# Patient Record
Sex: FEMALE | Race: OTHER | Employment: UNEMPLOYED | URBAN - METROPOLITAN AREA
[De-identification: names, ages, dates, MRNs, and addresses within clinical notes are randomized per-mention and may not be internally consistent; named-entity substitution may affect disease eponyms.]

---

## 2021-05-12 ENCOUNTER — OFFICE VISIT (OUTPATIENT)
Dept: FAMILY MEDICINE CLINIC | Age: 21
End: 2021-05-12
Payer: COMMERCIAL

## 2021-05-12 VITALS
HEIGHT: 66 IN | WEIGHT: 167 LBS | BODY MASS INDEX: 26.84 KG/M2 | HEART RATE: 72 BPM | SYSTOLIC BLOOD PRESSURE: 101 MMHG | DIASTOLIC BLOOD PRESSURE: 61 MMHG | TEMPERATURE: 98.6 F

## 2021-05-12 DIAGNOSIS — Z13.220 SCREENING, LIPID: ICD-10-CM

## 2021-05-12 DIAGNOSIS — Z00.00 WELL ADULT EXAM: Primary | ICD-10-CM

## 2021-05-12 PROCEDURE — 99203 OFFICE O/P NEW LOW 30 MIN: CPT | Performed by: FAMILY MEDICINE

## 2021-05-12 SDOH — ECONOMIC STABILITY: TRANSPORTATION INSECURITY
IN THE PAST 12 MONTHS, HAS LACK OF TRANSPORTATION KEPT YOU FROM MEETINGS, WORK, OR FROM GETTING THINGS NEEDED FOR DAILY LIVING?: NO

## 2021-05-12 SDOH — ECONOMIC STABILITY: FOOD INSECURITY: WITHIN THE PAST 12 MONTHS, YOU WORRIED THAT YOUR FOOD WOULD RUN OUT BEFORE YOU GOT MONEY TO BUY MORE.: NEVER TRUE

## 2021-05-12 SDOH — ECONOMIC STABILITY: TRANSPORTATION INSECURITY
IN THE PAST 12 MONTHS, HAS THE LACK OF TRANSPORTATION KEPT YOU FROM MEDICAL APPOINTMENTS OR FROM GETTING MEDICATIONS?: NO

## 2021-05-12 SDOH — ECONOMIC STABILITY: INCOME INSECURITY: HOW HARD IS IT FOR YOU TO PAY FOR THE VERY BASICS LIKE FOOD, HOUSING, MEDICAL CARE, AND HEATING?: NOT HARD AT ALL

## 2021-05-12 SDOH — ECONOMIC STABILITY: FOOD INSECURITY: WITHIN THE PAST 12 MONTHS, THE FOOD YOU BOUGHT JUST DIDN'T LAST AND YOU DIDN'T HAVE MONEY TO GET MORE.: NEVER TRUE

## 2021-05-12 ASSESSMENT — PATIENT HEALTH QUESTIONNAIRE - PHQ9
SUM OF ALL RESPONSES TO PHQ QUESTIONS 1-9: 0
SUM OF ALL RESPONSES TO PHQ9 QUESTIONS 1 & 2: 0

## 2021-05-12 ASSESSMENT — ENCOUNTER SYMPTOMS
DIARRHEA: 0
SHORTNESS OF BREATH: 0
COUGH: 0
SINUS PAIN: 0
BACK PAIN: 0
EYE REDNESS: 0
PHOTOPHOBIA: 0
RHINORRHEA: 0
CONSTIPATION: 0
EYE DISCHARGE: 0

## 2021-05-12 NOTE — PROGRESS NOTES
2021    Radha Carr    Chief Complaint   Patient presents with   [facility] Establish Care       HPI  History was obtained from patient. El Mast is a 21 y.o. female who presents today with the followin. Well adult exam    2. Screening, lipid       Is here to establish primary care. Patient has no problems at all today. She takes no medication on a regular basis. She was seen in the last month by gynecologist for routine checkup. Patient has a 3year-old daughter. She just moved to the area from [place] about 6 weeks ago. REVIEW OF SYMPTOMS    Review of Systems   Constitutional: Negative for chills, fatigue and fever. HENT: Negative for congestion, mouth sores, postnasal drip, rhinorrhea and sinus pain. Eyes: Negative for photophobia, discharge and redness. Respiratory: Negative for cough and shortness of breath. Cardiovascular: Negative for chest pain. Gastrointestinal: Negative for constipation and diarrhea. Genitourinary: Negative for difficulty urinating, dysuria, hematuria, urgency, vaginal bleeding and vaginal discharge. Musculoskeletal: Negative for back pain. Neurological: Negative for headaches. Psychiatric/Behavioral: Negative for sleep disturbance. PAST MEDICAL HISTORY  No past medical history on file. FAMILY HISTORY  No family history on file.     SOCIAL HISTORY  Social History     Socioeconomic History    Marital status: None     Spouse name: None    Number of children: None    Years of education: None    Highest education level: None   Occupational History    None   Social Needs    Financial resource strain: Not hard at all   Boomer-Niesha insecurity     Worry: Never true     Inability: Never true    Transportation needs     Medical: No     Non-medical: No   Tobacco Use    Smoking status: Never Smoker    Smokeless tobacco: Never Used   Substance and Sexual Activity    Alcohol use: None    Drug use: None    Sexual activity: None   Lifestyle    Physical activity Days per week: None     Minutes per session: None    Stress: None   Relationships    Social connections     Talks on phone: None     Gets together: None     Attends Congregational service: None     Active member of club or organization: None     Attends meetings of clubs or organizations: None     Relationship status: None    Intimate partner violence     Fear of current or ex partner: None     Emotionally abused: None     Physically abused: None     Forced sexual activity: None   Other Topics Concern    None   Social History Narrative    None        SURGICAL HISTORY  No past surgical history on file. CURRENT MEDICATIONS  No current outpatient medications on file. No current facility-administered medications for this visit. ALLERGIES  No Known Allergies    PHYSICAL EXAM    /61   Pulse 72   Temp 98.6 °F (37 °C) (Temporal)   Ht 5' 6\" (1.676 m)   Wt 167 lb (75.8 kg)   LMP 04/12/2021   HC 20 cm (7.87\")   BMI 26.95 kg/m²     Physical Exam  Vitals signs and nursing note reviewed. Constitutional:       Appearance: Normal appearance. HENT:      Head: Normocephalic and atraumatic. Right Ear: Tympanic membrane normal.      Nose: Nose normal.      Mouth/Throat:      Mouth: Mucous membranes are moist.      Pharynx: Oropharynx is clear. Eyes:      General: No scleral icterus. Extraocular Movements: Extraocular movements intact. Conjunctiva/sclera: Conjunctivae normal.   Neck:      Musculoskeletal: Neck supple. Cardiovascular:      Rate and Rhythm: Normal rate and regular rhythm. Pulses: Normal pulses. Heart sounds: Normal heart sounds. Pulmonary:      Effort: Pulmonary effort is normal.      Breath sounds: Normal breath sounds. Abdominal:      General: Abdomen is flat. Bowel sounds are normal.      Palpations: Abdomen is soft. There is no mass. Tenderness: There is no abdominal tenderness. There is no right CVA tenderness or left CVA tenderness.

## 2021-05-20 ENCOUNTER — HOSPITAL ENCOUNTER (OUTPATIENT)
Age: 21
Discharge: HOME | End: 2021-05-20
Payer: COMMERCIAL

## 2021-05-20 DIAGNOSIS — Z13.220 SCREENING, LIPID: ICD-10-CM

## 2021-05-20 LAB
CHOLESTEROL, FASTING: 139 MG/DL (ref 0–199)
HDLC SERPL-MCNC: 34 MG/DL
LDL CHOLESTEROL CALCULATED: 64 MG/DL (ref 0–99)
TRIGLYCERIDE, FASTING: 204 MG/DL (ref 0–149)
VLDLC SERPL CALC-MCNC: 41 MG/DL

## 2021-05-20 PROCEDURE — 80061 LIPID PANEL: CPT

## 2021-05-20 PROCEDURE — 36415 COLL VENOUS BLD VENIPUNCTURE: CPT

## 2021-07-09 ENCOUNTER — NURSE TRIAGE (OUTPATIENT)
Dept: OTHER | Facility: CLINIC | Age: 21
End: 2021-07-09

## 2021-07-09 ENCOUNTER — TELEPHONE (OUTPATIENT)
Dept: ADMINISTRATIVE | Age: 21
End: 2021-07-09

## 2021-07-09 NOTE — TELEPHONE ENCOUNTER
Reason for Disposition   Bloody, black, or tarry bowel movements (Exception: chronic-unchanged black-grey bowel movements and is taking iron pills or Pepto-bismol)    Answer Assessment - Initial Assessment Questions  1. DIARRHEA SEVERITY: \"How bad is the diarrhea? \" \"How many extra stools have you had in the past 24 hours than normal?\"     - NO DIARRHEA (SCALE 0)    - MILD (SCALE 1-3): Few loose or mushy BMs; increase of 1-3 stools over normal daily number of stools; mild increase in ostomy output. -  MODERATE (SCALE 4-7): Increase of 4-6 stools daily over normal; moderate increase in ostomy output. * SEVERE (SCALE 8-10; OR 'WORST POSSIBLE'): Increase of 7 or more stools daily over normal; moderate increase in ostomy output; incontinence.      5    2. ONSET: \"When did the diarrhea begin? \"       8 days ago    3. BM CONSISTENCY: \"How loose or watery is the diarrhea? \"       Water    4. VOMITING: \"Are you also vomiting? \" If so, ask: \"How many times in the past 24 hours? \"       None    5. ABDOMINAL PAIN: Eliverto Simmers you having any abdominal pain? \" If yes: \"What does it feel like? \" (e.g., crampy, dull, intermittent, constant)       Cramping like a period    6. ABDOMINAL PAIN SEVERITY: If present, ask: \"How bad is the pain? \"  (e.g., Scale 1-10; mild, moderate, or severe)    - MILD (1-3): doesn't interfere with normal activities, abdomen soft and not tender to touch     - MODERATE (4-7): interferes with normal activities or awakens from sleep, tender to touch     - SEVERE (8-10): excruciating pain, doubled over, unable to do any normal activities        8/10    7. ORAL INTAKE: If vomiting, \"Have you been able to drink liquids? \" \"How much fluids have you had in the past 24 hours? \"      Yes, eating less, but more fluids    8. HYDRATION: \"Any signs of dehydration? \" (e.g., dry mouth [not just dry lips], too weak to stand, dizziness, new weight loss) \"When did you last urinate? \"      Normal    9.  EXPOSURE: \"Have you traveled to a foreign country recently? \" \"Have you been exposed to anyone with diarrhea? \" \"Could you have eaten any food that was spoiled? \"      Not that she is aware of    10. ANTIBIOTIC USE: \"Are you taking antibiotics now or have you taken antibiotics in the past 2 months? \"        None     11. OTHER SYMPTOMS: \"Do you have any other symptoms? \" (e.g., fever, blood in stool)        Tooth pain and earache- diarrhea looks black yesterday    12. PREGNANCY: \"Is there any chance you are pregnant? \" \"When was your last menstrual period? \"        None on Birth control    Protocols used: DIARRHEA-ADULT-[place]    Received call from Antonella at [facility] with [employer]. Brief description of triage: Diarrhea for 8 days. Caller states it is watery and black in appearance. Caller states she has severe abdominal pain, toothache and ear pain. Caller denies fever. Triage indicates for patient to Go to ED Now. Care advice provided, patient verbalizes understanding; denies any other questions or concerns; instructed to call back for any new or worsening symptoms. Writer did not transfer patient to ECC. Attention Provider: Thank you for allowing me to participate in the care of your patient. The patient was connected to triage in response to information provided to the [facility]. Please do not respond through this encounter as the response is not directed to a shared pool.

## 2021-07-13 ENCOUNTER — HOSPITAL ENCOUNTER (EMERGENCY)
Age: 21
Discharge: HOME | End: 2021-07-13
Attending: EMERGENCY MEDICINE
Payer: COMMERCIAL

## 2021-07-13 VITALS
BODY MASS INDEX: 23.3 KG/M2 | HEART RATE: 68 BPM | RESPIRATION RATE: 16 BRPM | OXYGEN SATURATION: 99 % | TEMPERATURE: 97.1 F | SYSTOLIC BLOOD PRESSURE: 122 MMHG | WEIGHT: 145 LBS | HEIGHT: 66 IN | DIASTOLIC BLOOD PRESSURE: 72 MMHG

## 2021-07-13 DIAGNOSIS — J02.9 VIRAL PHARYNGITIS: Primary | ICD-10-CM

## 2021-07-13 DIAGNOSIS — R19.7 DIARRHEA, UNSPECIFIED TYPE: ICD-10-CM

## 2021-07-13 DIAGNOSIS — J02.9 SORE THROAT: ICD-10-CM

## 2021-07-13 LAB
ALBUMIN SERPL-MCNC: 4.4 G/DL (ref 3.5–5.2)
ALP BLD-CCNC: 47 U/L (ref 35–104)
ALT SERPL-CCNC: 5 U/L (ref 0–32)
ANION GAP SERPL CALCULATED.3IONS-SCNC: 11 MMOL/L (ref 7–16)
AST SERPL-CCNC: 16 U/L (ref 0–31)
BASOPHILS ABSOLUTE: 0.04 E9/L (ref 0–0.2)
BASOPHILS RELATIVE PERCENT: 0.8 % (ref 0–2)
BILIRUB SERPL-MCNC: 0.5 MG/DL (ref 0–1.2)
BILIRUBIN URINE: NEGATIVE
BLOOD, URINE: NEGATIVE
BUN BLDV-MCNC: 9 MG/DL (ref 6–20)
CALCIUM SERPL-MCNC: 9.3 MG/DL (ref 8.6–10.2)
CHLORIDE BLD-SCNC: 105 MMOL/L (ref 98–107)
CLARITY: CLEAR
CO2: 21 MMOL/L (ref 22–29)
COLOR: YELLOW
CREAT SERPL-MCNC: 0.6 MG/DL (ref 0.5–1)
EOSINOPHILS ABSOLUTE: 0.02 E9/L (ref 0.05–0.5)
EOSINOPHILS RELATIVE PERCENT: 0.4 % (ref 0–6)
GFR AFRICAN AMERICAN: >60
GFR NON-AFRICAN AMERICAN: >60 ML/MIN/1.73
GLUCOSE BLD-MCNC: 83 MG/DL (ref 74–99)
GLUCOSE URINE: NEGATIVE MG/DL
HCG(URINE) PREGNANCY TEST: NEGATIVE
HCT VFR BLD CALC: 45.1 % (ref 34–48)
HEMOGLOBIN: 15.3 G/DL (ref 11.5–15.5)
IMMATURE GRANULOCYTES #: 0.01 E9/L
IMMATURE GRANULOCYTES %: 0.2 % (ref 0–5)
KETONES, URINE: NEGATIVE MG/DL
LEUKOCYTE ESTERASE, URINE: NEGATIVE
LYMPHOCYTES ABSOLUTE: 2.32 E9/L (ref 1.5–4)
LYMPHOCYTES RELATIVE PERCENT: 48.2 % (ref 20–42)
MCH RBC QN AUTO: 30.1 PG (ref 26–35)
MCHC RBC AUTO-ENTMCNC: 33.9 % (ref 32–34.5)
MCV RBC AUTO: 88.8 FL (ref 80–99.9)
MONOCYTES ABSOLUTE: 0.32 E9/L (ref 0.1–0.95)
MONOCYTES RELATIVE PERCENT: 6.7 % (ref 2–12)
NEUTROPHILS ABSOLUTE: 2.1 E9/L (ref 1.8–7.3)
NEUTROPHILS RELATIVE PERCENT: 43.7 % (ref 43–80)
NITRITE, URINE: NEGATIVE
PDW BLD-RTO: 12.5 FL (ref 11.5–15)
PH UA: 6 (ref 5–9)
PLATELET # BLD: 171 E9/L (ref 130–450)
PMV BLD AUTO: 10.4 FL (ref 7–12)
POTASSIUM SERPL-SCNC: 4 MMOL/L (ref 3.5–5)
PROTEIN UA: NEGATIVE MG/DL
RBC # BLD: 5.08 E12/L (ref 3.5–5.5)
SODIUM BLD-SCNC: 137 MMOL/L (ref 132–146)
SPECIFIC GRAVITY UA: 1.02 (ref 1–1.03)
STREP GRP A PCR: NEGATIVE
TOTAL PROTEIN: 7.4 G/DL (ref 6.4–8.3)
UROBILINOGEN, URINE: 0.2 E.U./DL
WBC # BLD: 4.8 E9/L (ref 4.5–11.5)

## 2021-07-13 PROCEDURE — 80053 COMPREHEN METABOLIC PANEL: CPT

## 2021-07-13 PROCEDURE — 81003 URINALYSIS AUTO W/O SCOPE: CPT

## 2021-07-13 PROCEDURE — 36415 COLL VENOUS BLD VENIPUNCTURE: CPT

## 2021-07-13 PROCEDURE — 2580000003 HC RX 258: Performed by: EMERGENCY MEDICINE

## 2021-07-13 PROCEDURE — 81025 URINE PREGNANCY TEST: CPT

## 2021-07-13 PROCEDURE — 87880 STREP A ASSAY W/OPTIC: CPT

## 2021-07-13 PROCEDURE — 85025 COMPLETE CBC W/AUTO DIFF WBC: CPT

## 2021-07-13 PROCEDURE — 99283 EMERGENCY DEPT VISIT LOW MDM: CPT

## 2021-07-13 RX ORDER — 0.9 % SODIUM CHLORIDE 0.9 %
1000 INTRAVENOUS SOLUTION INTRAVENOUS ONCE
Status: COMPLETED | OUTPATIENT
Start: 2021-07-13 | End: 2021-07-13

## 2021-07-13 RX ORDER — DICYCLOMINE HYDROCHLORIDE 10 MG/1
20 CAPSULE ORAL
Qty: 15 CAPSULE | Refills: 0 | Status: SHIPPED | OUTPATIENT
Start: 2021-07-13 | End: 2022-02-02

## 2021-07-13 RX ADMIN — SODIUM CHLORIDE 1000 ML: 9 INJECTION, SOLUTION INTRAVENOUS at 22:26

## 2021-07-14 NOTE — ED PROVIDER NOTES
HPI:  7/13/21, Time: 11:09 PM EDT        January Chand is a 21 y.o. female presenting to the ED for sore throat, ear pain, congestion and diarrhea, beginning 2 weeks ago. The complaint has been persistent, moderate in severity, and worsened by diarrhea. No reported fever/chills, no chest pain heaviness no tightness no abdominal pain. She states that she feels weak. Patient is having taking thing for diarrhea relief to this point. No hematemesis no melena hematochezia reported. No other complaints are reported. No relieving factors. Review of Systems:   A complete review of systems was performed and pertinent positives and negatives are stated within HPI, all other systems reviewed and are negative.      --------------------------------------------- PAST HISTORY ---------------------------------------------  Past Medical History:  has no past medical history on file. Past Surgical History:  has a past surgical history that includes fracture surgery. Social History:  reports that she has never smoked. She has never used smokeless tobacco. She reports previous alcohol use. She reports previous drug use. Family History: family history is not on file. The patients home medications have been reviewed. Allergies: Patient has no known allergies.     -------------------------------------------------- RESULTS -------------------------------------------------  All laboratory and radiology results have been personally reviewed by myself   LABS:  Results for orders placed or performed during the hospital encounter of 07/13/21   Strep Screen Group A Throat    Specimen: Throat   Result Value Ref Range    Strep Grp A PCR Negative Negative   CBC Auto Differential   Result Value Ref Range    WBC 4.8 4.5 - 11.5 E9/L    RBC 5.08 3.50 - 5.50 E12/L    Hemoglobin 15.3 11.5 - 15.5 g/dL    Hematocrit 45.1 34.0 - 48.0 %    MCV 88.8 80.0 - 99.9 fL    MCH 30.1 26.0 - 35.0 pg    MCHC 33.9 32.0 - 34.5 %    RDW 12.5 11.5 - 15.0 fL    Platelets 187 639 - 833 E9/L    MPV 10.4 7.0 - 12.0 fL    Neutrophils % 43.7 43.0 - 80.0 %    Immature Granulocytes % 0.2 0.0 - 5.0 %    Lymphocytes % 48.2 (H) 20.0 - 42.0 %    Monocytes % 6.7 2.0 - 12.0 %    Eosinophils % 0.4 0.0 - 6.0 %    Basophils % 0.8 0.0 - 2.0 %    Neutrophils Absolute 2.10 1.80 - 7.30 E9/L    Immature Granulocytes # 0.01 E9/L    Lymphocytes Absolute 2.32 1.50 - 4.00 E9/L    Monocytes Absolute 0.32 0.10 - 0.95 E9/L    Eosinophils Absolute 0.02 (L) 0.05 - 0.50 E9/L    Basophils Absolute 0.04 0.00 - 0.20 E9/L   Comprehensive Metabolic Panel   Result Value Ref Range    Sodium 137 132 - 146 mmol/L    Potassium 4.0 3.5 - 5.0 mmol/L    Chloride 105 98 - 107 mmol/L    CO2 21 (L) 22 - 29 mmol/L    Anion Gap 11 7 - 16 mmol/L    Glucose 83 74 - 99 mg/dL    BUN 9 6 - 20 mg/dL    CREATININE 0.6 0.5 - 1.0 mg/dL    GFR Non-African American >60 >=60 mL/min/1.73    GFR African American >60     Calcium 9.3 8.6 - 10.2 mg/dL    Total Protein 7.4 6.4 - 8.3 g/dL    Albumin 4.4 3.5 - 5.2 g/dL    Total Bilirubin 0.5 0.0 - 1.2 mg/dL    Alkaline Phosphatase 47 35 - 104 U/L    ALT 5 0 - 32 U/L    AST 16 0 - 31 U/L   Pregnancy, Urine   Result Value Ref Range    HCG(Urine) Pregnancy Test NEGATIVE NEGATIVE   Urinalysis   Result Value Ref Range    Color, UA Yellow Straw/Yellow    Clarity, UA Clear Clear    Glucose, Ur Negative Negative mg/dL    Bilirubin Urine Negative Negative    Ketones, Urine Negative Negative mg/dL    Specific Gravity, UA 1.020 1.005 - 1.030    Blood, Urine Negative Negative    pH, UA 6.0 5.0 - 9.0    Protein, UA Negative Negative mg/dL    Urobilinogen, Urine 0.2 <2.0 E.U./dL    Nitrite, Urine Negative Negative    Leukocyte Esterase, Urine Negative Negative       RADIOLOGY:  Interpreted by Radiologist.  No orders to display       ------------------------- NURSING NOTES AND VITALS REVIEWED ---------------------------   The nursing notes within the ED encounter and vital signs as below have been reviewed. /72   Pulse 68   Temp 97.1 °F (36.2 °C) (Temporal)   Resp 16   Ht 5' 6\" (1.676 m)   Wt 145 lb (65.8 kg)   LMP  (LMP Unknown)   SpO2 99%   BMI 23.40 kg/m²   Oxygen Saturation Interpretation: Normal      ---------------------------------------------------PHYSICAL EXAM--------------------------------------      Constitutional/General: Alert and oriented x3, well appearing, non toxic in NAD  Head: Normocephalic and atraumatic  Eyes: PERRL, EOMI  Mouth: Oropharynx clear, handling secretions, no trismus; no exudates no uvular shift no pooling secretions. No findings to suggest peritonsillar abscess  Neck: Supple, full ROM, no JVD. Trachea midline  Pulmonary: Lungs clear to auscultation bilaterally, no wheezes, rales, or rhonchi. Not in respiratory distress  Cardiovascular:  Regular rate and rhythm, no murmurs, gallops, or rubs. 2+ distal pulses  Abdomen: Soft, non tender, non distended, otherwise normal; no focal tenderness in the right lower quadrant/McBurney's point. No rebound no guarding no rigidity no organomegaly no masses  Extremities: Moves all extremities x 4. Warm and well perfused  Skin: warm and dry without rash  Neurologic: GCS 15, cranial nerves II through XII intact no focal deficits per no meningeal signs  Psych: Normal Affect      ------------------------------ ED COURSE/MEDICAL DECISION MAKING----------------------  Medications   0.9 % sodium chloride bolus (1,000 mLs Intravenous New Bag 7/13/21 [id number])         ED COURSE:     Medical Decision Making:   Patient has no focal tenderness to palpation of the abdomen in the right lower quadrant to suggest appendicitis on serial evaluations. Patient was advised to try Imodium over-the-counter for diarrhea relief. She will be prescribed Bentyl also for abdominal pain as needed. Patient encouraged to drink Gatorade and/or Pedialyte for fluid hydration.   She is to get immediate medical attention if she develops any right-sided abdominal pain or any worsening symptoms. Counseling: The emergency provider has spoken with the patient and discussed todays results, in addition to providing specific details for the plan of care and counseling regarding the diagnosis and prognosis. Questions are answered at this time and they are agreeable with the plan.    --------------------------------- IMPRESSION AND DISPOSITION ---------------------------------    IMPRESSION  1. Viral pharyngitis    2. Sore throat    3. Diarrhea, unspecified type        DISPOSITION  Disposition: Discharge to home  Patient condition is stable      NOTE: This report was transcribed using voice recognition software.  Every effort was made to ensure accuracy; however, inadvertent computerized transcription errors may be present        Sli Pizarro MD  07/13/21 1962

## 2021-12-02 ENCOUNTER — NURSE TRIAGE (OUTPATIENT)
Dept: OTHER | Facility: CLINIC | Age: 21
End: 2021-12-02

## 2021-12-02 NOTE — TELEPHONE ENCOUNTER
Reason for Disposition   [1] MODERATE weakness (i.e., interferes with work, school, normal activities) AND [2] persists > 3 days    Answer Assessment - Initial Assessment Questions  1. DESCRIPTION: \"Describe how you are feeling. \"      Sleeping more than normal, out of energy, don't want to go out and do my regular routine. 2. SEVERITY: \"How bad is it? \"  \"Can you stand and walk? \"    - MILD - Feels weak or tired, but does not interfere with work, school or normal activities    - [facility] to stand and walk; weakness interferes with work, school, or normal activities    - SEVERE - Unable to stand or walk      Moderate    3. ONSET:  \"When did the weakness begin? \"      About a week or 2 ago    4. CAUSE: \"What do you think is causing the Fatigue? \"      I don't really know, maybe my iron, or diabetic. 5. MEDICINES: Richie Ebbs you recently started a new medicine or had a change in the amount of a medicine? \"      No new meds or change in dosage    6. OTHER SYMPTOMS: \"Do you have any other symptoms? \" (e.g., chest pain, fever, cough, SOB, vomiting, diarrhea, bleeding, other areas of pain)      Extreme thirst, waking up during the night to get a drink, increased appetite. 7. PREGNANCY: \"Is there any chance you are pregnant? \" \"When was your last menstrual period? \"      No- LMP- depo      Diabetes runs in my family on both parents sides (dad is pre-diabetic)    Protocols used: WEAKNESS (GENERALIZED) AND FATIGUE-ADULT-    Received call from Nandini Alejo at [facility] with Red Flag Complaint. Brief description of triage: excessive thirst and fatigue    Triage indicates for patient to see PCP within 24 hours. Walk in clinic if no appts. available    Care advice provided, patient verbalizes understanding; denies any other questions or concerns; instructed to call back for any new or worsening symptoms. Writer provided warm transfer to [place] at [facility] for appointment scheduling.     Attention Provider: Thank you for allowing me to participate in the care of your patient. The patient was connected to triage in response to information provided to the E[facility]. Please do not respond through this encounter as the response is not directed to a shared pool.

## 2022-02-02 ENCOUNTER — APPOINTMENT (OUTPATIENT)
Dept: GENERAL RADIOLOGY | Age: 22
End: 2022-02-02
Payer: COMMERCIAL

## 2022-02-02 ENCOUNTER — HOSPITAL ENCOUNTER (EMERGENCY)
Age: 22
Discharge: HOME | End: 2022-02-03
Attending: EMERGENCY MEDICINE
Payer: COMMERCIAL

## 2022-02-02 VITALS
HEART RATE: 89 BPM | TEMPERATURE: 96.9 F | DIASTOLIC BLOOD PRESSURE: 70 MMHG | HEIGHT: 66 IN | RESPIRATION RATE: 16 BRPM | BODY MASS INDEX: 28.93 KG/M2 | OXYGEN SATURATION: 98 % | SYSTOLIC BLOOD PRESSURE: 116 MMHG | WEIGHT: 180 LBS

## 2022-02-02 DIAGNOSIS — R11.0 NAUSEA: ICD-10-CM

## 2022-02-02 DIAGNOSIS — R19.7 DIARRHEA, UNSPECIFIED TYPE: ICD-10-CM

## 2022-02-02 DIAGNOSIS — R10.30 LOWER ABDOMINAL PAIN: Primary | ICD-10-CM

## 2022-02-02 LAB
ALBUMIN SERPL-MCNC: 4.9 G/DL (ref 3.5–5.2)
ALP BLD-CCNC: 55 U/L (ref 35–104)
ALT SERPL-CCNC: 13 U/L (ref 0–32)
ANION GAP SERPL CALCULATED.3IONS-SCNC: 13 MMOL/L (ref 7–16)
AST SERPL-CCNC: 20 U/L (ref 0–31)
BACTERIA: ABNORMAL /HPF
BASOPHILS ABSOLUTE: 0.03 E9/L (ref 0–0.2)
BASOPHILS RELATIVE PERCENT: 0.3 % (ref 0–2)
BILIRUB SERPL-MCNC: 0.7 MG/DL (ref 0–1.2)
BILIRUBIN URINE: NEGATIVE
BLOOD, URINE: NORMAL
BUN BLDV-MCNC: 10 MG/DL (ref 6–20)
CALCIUM SERPL-MCNC: 9.9 MG/DL (ref 8.6–10.2)
CHLORIDE BLD-SCNC: 107 MMOL/L (ref 98–107)
CLARITY: CLEAR
CO2: 19 MMOL/L (ref 22–29)
COLOR: YELLOW
CREAT SERPL-MCNC: 0.7 MG/DL (ref 0.5–1)
EOSINOPHILS ABSOLUTE: 0.07 E9/L (ref 0.05–0.5)
EOSINOPHILS RELATIVE PERCENT: 0.7 % (ref 0–6)
GFR AFRICAN AMERICAN: >60
GFR NON-AFRICAN AMERICAN: >60 ML/MIN/1.73
GLUCOSE BLD-MCNC: 86 MG/DL (ref 74–99)
GLUCOSE URINE: NEGATIVE MG/DL
HCG(URINE) PREGNANCY TEST: NEGATIVE
HCT VFR BLD CALC: 48.3 % (ref 34–48)
HEMOGLOBIN: 16.6 G/DL (ref 11.5–15.5)
IMMATURE GRANULOCYTES #: 0.05 E9/L
IMMATURE GRANULOCYTES %: 0.5 % (ref 0–5)
KETONES, URINE: NEGATIVE MG/DL
LACTIC ACID: 1.3 MMOL/L (ref 0.5–2.2)
LEUKOCYTE ESTERASE, URINE: NEGATIVE
LIPASE: 21 U/L (ref 13–60)
LYMPHOCYTES ABSOLUTE: 1.91 E9/L (ref 1.5–4)
LYMPHOCYTES RELATIVE PERCENT: 18.3 % (ref 20–42)
MAGNESIUM: 1.9 MG/DL (ref 1.6–2.6)
MCH RBC QN AUTO: 29.6 PG (ref 26–35)
MCHC RBC AUTO-ENTMCNC: 34.4 % (ref 32–34.5)
MCV RBC AUTO: 86.3 FL (ref 80–99.9)
MONOCYTES ABSOLUTE: 0.55 E9/L (ref 0.1–0.95)
MONOCYTES RELATIVE PERCENT: 5.3 % (ref 2–12)
NEUTROPHILS ABSOLUTE: 7.83 E9/L (ref 1.8–7.3)
NEUTROPHILS RELATIVE PERCENT: 74.9 % (ref 43–80)
NITRITE, URINE: NEGATIVE
PDW BLD-RTO: 12.6 FL (ref 11.5–15)
PH UA: 5.5 (ref 5–9)
PLATELET # BLD: 231 E9/L (ref 130–450)
PMV BLD AUTO: 10 FL (ref 7–12)
POTASSIUM SERPL-SCNC: 4.1 MMOL/L (ref 3.5–5)
PROTEIN UA: NEGATIVE MG/DL
RBC # BLD: 5.6 E12/L (ref 3.5–5.5)
RBC UA: ABNORMAL /HPF (ref 0–2)
REASON FOR REJECTION: NORMAL
REJECTED TEST: NORMAL
SODIUM BLD-SCNC: 139 MMOL/L (ref 132–146)
SPECIFIC GRAVITY UA: >=1.03 (ref 1–1.03)
TOTAL PROTEIN: 8 G/DL (ref 6.4–8.3)
UROBILINOGEN, URINE: 0.2 E.U./DL
WBC # BLD: 10.4 E9/L (ref 4.5–11.5)
WBC UA: ABNORMAL /HPF (ref 0–5)

## 2022-02-02 PROCEDURE — 80053 COMPREHEN METABOLIC PANEL: CPT

## 2022-02-02 PROCEDURE — 83690 ASSAY OF LIPASE: CPT

## 2022-02-02 PROCEDURE — 96374 THER/PROPH/DIAG INJ IV PUSH: CPT

## 2022-02-02 PROCEDURE — 83605 ASSAY OF LACTIC ACID: CPT

## 2022-02-02 PROCEDURE — 81025 URINE PREGNANCY TEST: CPT

## 2022-02-02 PROCEDURE — 2580000003 HC RX 258: Performed by: EMERGENCY MEDICINE

## 2022-02-02 PROCEDURE — 74022 RADEX COMPL AQT ABD SERIES: CPT

## 2022-02-02 PROCEDURE — 87088 URINE BACTERIA CULTURE: CPT

## 2022-02-02 PROCEDURE — 99283 EMERGENCY DEPT VISIT LOW MDM: CPT

## 2022-02-02 PROCEDURE — 83735 ASSAY OF MAGNESIUM: CPT

## 2022-02-02 PROCEDURE — 85025 COMPLETE CBC W/AUTO DIFF WBC: CPT

## 2022-02-02 PROCEDURE — 81001 URINALYSIS AUTO W/SCOPE: CPT

## 2022-02-02 PROCEDURE — 6360000002 HC RX W HCPCS: Performed by: EMERGENCY MEDICINE

## 2022-02-02 PROCEDURE — 36415 COLL VENOUS BLD VENIPUNCTURE: CPT

## 2022-02-02 RX ORDER — ONDANSETRON 2 MG/ML
4 INJECTION INTRAMUSCULAR; INTRAVENOUS ONCE
Status: COMPLETED | OUTPATIENT
Start: 2022-02-02 | End: 2022-02-02

## 2022-02-02 RX ORDER — SODIUM CHLORIDE, SODIUM LACTATE, POTASSIUM CHLORIDE, AND CALCIUM CHLORIDE .6; .31; .03; .02 G/100ML; G/100ML; G/100ML; G/100ML
1000 INJECTION, SOLUTION INTRAVENOUS ONCE
Status: COMPLETED | OUTPATIENT
Start: 2022-02-02 | End: 2022-02-03

## 2022-02-02 RX ADMIN — ONDANSETRON 4 MG: 2 INJECTION INTRAMUSCULAR; INTRAVENOUS at 22:14

## 2022-02-02 RX ADMIN — SODIUM CHLORIDE, POTASSIUM CHLORIDE, SODIUM LACTATE AND CALCIUM CHLORIDE 1000 ML: 600; 310; 30; 20 INJECTION, SOLUTION INTRAVENOUS at 22:17

## 2022-02-02 NOTE — Clinical Note
Gladis Martinez was seen and treated in our emergency department on 2/2/2022. She may return to work on 02/04/2022. If you have any questions or concerns, please don't hesitate to call.       Natalee Montiel MD

## 2022-02-02 NOTE — Clinical Note
Herrera Lazcano was seen and treated in our emergency department on 2/2/2022. She may return to work on 02/04/2022. If you have any questions or concerns, please don't hesitate to call.       Minta Bloch, MD

## 2022-02-03 RX ORDER — FAMOTIDINE 20 MG/1
20 TABLET, FILM COATED ORAL 2 TIMES DAILY
Qty: 10 TABLET | Refills: 0 | Status: SHIPPED | OUTPATIENT
Start: 2022-02-03 | End: 2022-02-08

## 2022-02-03 RX ORDER — ONDANSETRON 4 MG/1
8 TABLET, ORALLY DISINTEGRATING ORAL EVERY 8 HOURS PRN
Qty: 10 TABLET | Refills: 0 | Status: SHIPPED | OUTPATIENT
Start: 2022-02-03

## 2022-02-03 RX ORDER — DICYCLOMINE HYDROCHLORIDE 10 MG/1
20 CAPSULE ORAL
Qty: 20 CAPSULE | Refills: 0 | Status: SHIPPED | OUTPATIENT
Start: 2022-02-03

## 2022-02-03 NOTE — ED PROVIDER NOTES
HPI:  2/2/22,   Time: 9:44 PM RAYMOND Patel is a 24 y.o. female presenting to the ED for Mid abdominal pain, watery diarrhea and nausea , beginning 2 days ago. The complaint has been persistent, moderate in severity, and worsened by nothing. Patient presents with watery diarrhea nausea, no vomiting. Onset was 2 days ago. States unable to tolerate liquids not eating well. She is unable to urinate. She denies any urinary complaints. No recent travel antibiotic use no close contacts ill she has had no Covid exposures she has not been vaccinated for Covid. She said no abdominal surgeries. Including EGD or colonoscopy. She has no Fever chills Chest pain or SOB. ROS:   Pertinent positives and negatives are stated within HPI, all other systems reviewed and are negative.  --------------------------------------------- PAST HISTORY ---------------------------------------------  Past Medical History:  has no past medical history on file. Past Surgical History:  has a past surgical history that includes fracture surgery. Social History:  reports that she has never smoked. She has never used smokeless tobacco. She reports previous alcohol use. She reports previous drug use. Family History: family history is not on file. The patients home medications have been reviewed. Allergies: Patient has no known allergies.     -------------------------------------------------- RESULTS -------------------------------------------------  All laboratory and radiology results have been personally reviewed by myself   LABS:  Results for orders placed or performed during the hospital encounter of 02/02/22   Pregnancy, Urine   Result Value Ref Range    HCG(Urine) Pregnancy Test NEGATIVE NEGATIVE   CBC Auto Differential   Result Value Ref Range    WBC 10.4 4.5 - 11.5 E9/L    RBC 5.60 (H) 3.50 - 5.50 E12/L    Hemoglobin 16.6 (H) 11.5 - 15.5 g/dL    Hematocrit 48.3 (H) 34.0 - 48.0 %    MCV 86.3 80.0 - 99.9 fL MCH 29.6 26.0 - 35.0 pg    MCHC 34.4 32.0 - 34.5 %    RDW 12.6 11.5 - 15.0 fL    Platelets 557 628 - 307 E9/L    MPV 10.0 7.0 - 12.0 fL    Neutrophils % 74.9 43.0 - 80.0 %    Immature Granulocytes % 0.5 0.0 - 5.0 %    Lymphocytes % 18.3 (L) 20.0 - 42.0 %    Monocytes % 5.3 2.0 - 12.0 %    Eosinophils % 0.7 0.0 - 6.0 %    Basophils % 0.3 0.0 - 2.0 %    Neutrophils Absolute 7.83 (H) 1.80 - 7.30 E9/L    Immature Granulocytes # 0.05 E9/L    Lymphocytes Absolute 1.91 1.50 - 4.00 E9/L    Monocytes Absolute 0.55 0.10 - 0.95 E9/L    Eosinophils Absolute 0.07 0.05 - 0.50 E9/L    Basophils Absolute 0.03 0.00 - 0.20 E9/L   Comprehensive Metabolic Panel   Result Value Ref Range    Sodium 139 132 - 146 mmol/L    Potassium 4.1 3.5 - 5.0 mmol/L    Chloride 107 98 - 107 mmol/L    CO2 19 (L) 22 - 29 mmol/L    Anion Gap 13 7 - 16 mmol/L    Glucose 86 74 - 99 mg/dL    BUN 10 6 - 20 mg/dL    CREATININE 0.7 0.5 - 1.0 mg/dL    GFR Non-African American >60 >=60 mL/min/1.73    GFR African American >60     Calcium 9.9 8.6 - 10.2 mg/dL    Total Protein 8.0 6.4 - 8.3 g/dL    Albumin 4.9 3.5 - 5.2 g/dL    Total Bilirubin 0.7 0.0 - 1.2 mg/dL    Alkaline Phosphatase 55 35 - 104 U/L    ALT 13 0 - 32 U/L    AST 20 0 - 31 U/L   Lipase   Result Value Ref Range    Lipase 21 13 - 60 U/L   Urinalysis   Result Value Ref Range    Color, UA Yellow Straw/Yellow    Clarity, UA Clear Clear    Glucose, Ur Negative Negative mg/dL    Bilirubin Urine Negative Negative    Ketones, Urine Negative Negative mg/dL    Specific Gravity, UA >=1.030 1.005 - 1.030    Blood, Urine TRACE-INTACT Negative    pH, UA 5.5 5.0 - 9.0    Protein, UA Negative Negative mg/dL    Urobilinogen, Urine 0.2 <2.0 E.U./dL    Nitrite, Urine Negative Negative    Leukocyte Esterase, Urine Negative Negative   Magnesium   Result Value Ref Range    Magnesium 1.9 1.6 - 2.6 mg/dL   SPECIMEN REJECTION   Result Value Ref Range    Rejected Test lacid     Reason for Rejection see below    Lactic acid, plasma   Result Value Ref Range    Lactic Acid 1.3 0.5 - 2.2 mmol/L   Microscopic Urinalysis   Result Value Ref Range    WBC, UA 1-3 0 - 5 /HPF    RBC, UA 0-1 0 - 2 /HPF    Bacteria, UA FEW (A) None Seen /HPF       RADIOLOGY:  Interpreted by Radiologist.  XR ACUTE ABD SERIES CHEST 1 VW   Final Result   No acute pulmonary or cardiac abnormalities. Normal appearing abdomen. ------------------------- NURSING NOTES AND VITALS REVIEWED ---------------------------    The nursing notes within the ED encounter and vital signs as below have been reviewed. /70   Pulse 89   Temp 96.9 °F (36.1 °C)   Resp 16   Ht 5' 6\" (1.676 m)   Wt 180 lb (81.6 kg) Comment: Simultaneous filing. User may not have seen previous data. SpO2 98%   BMI 29.05 kg/m²   Oxygen Saturation Interpretation: Normal    ---------------------------------------------------PHYSICAL EXAM--------------------------------------    Constitutional/General: Alert and oriented x3, well appearing, non toxic in NAD at this time  Head: NC/AT  Eyes: PERRL, EOMI  Mouth: Oropharynx clear, handling secretions, no trismus, no tonsillar hypertrophy exudates or erythema noted. Neck: Supple, full ROM, no nuchal rigidity, no JVD. Trachea midline  Pulmonary: Lungs clear to auscultation bilaterally, no wheezes, rales, or rhonchi. Not in respiratory distress  Cardiovascular:  Regular rate and rhythm, no murmurs, gallops, or rubs. 2+ distal pulses  Abdomen: Soft, non tender, non distended, no rebound tenderness or guarding no pulsatile masses she has no pain at McBurney's point she has no pain in the right upper quadrant. She has no hepatosplenomegaly. Extremities: Moves all extremities x 4. Warm and well perfused  Skin: warm and dry without rash; no petechia no purpura no target lesions no bullae  Neurologic: GCS 15, cranial nerves II through XII grossly intact no focal deficits.   No meningeal signs  Psych: Normal Affect    ------------------------------ ED COURSE/MEDICAL DECISION MAKING----------------------  Medications   lactated ringers bolus (1,000 mLs IntraVENous New Bag 2/2/22 2217)   ondansetron (ZOFRAN) injection 4 mg (4 mg IntraVENous Given 2/2/22 2214)      Medical Decision Making:   Differential diagnoses included:  Gastritis, acute enteritis, appendicitis, mesenteric adenitis, UTI/cystitis, ectopic pregnancy, IBS, to name a few. Patient had no focal tenderness at all in the right lower quadrant/McBurney's point on serial exams; no findings to suggest appendicitis clinically. Laboratory findings were unremarkable and the patient actually denied pain during her course of evaluation here in the ED. She has not had any negative progression of her symptoms at all. Patient is smiling at the bedside talking on her cell phone and in no apparent distress on serial evaluations. On this basis, CT abdominal studies not felt to be prudent as risk of radiation is felt to outweigh any likely benefit at this time. Patient is advised however that if her symptoms worsen she is to get immediate medical attention/reevaluation and this is reinforced on her home-going instructions. At this time the patient is without objective evidence of an acute process requiring hospitalization or inpatient management. They have remained hemodynamically stable throughout their entire ED visit and are stable for discharge with outpatient follow-up. The plan has been discussed in detail and they are aware of the specific conditions for emergent return, as well as the importance of follow-up. Counseling: The emergency provider has spoken with the patient and discussed todays results, in addition to providing specific details for the plan of care and counseling regarding the diagnosis and prognosis.   Questions are answered at this time and they are agreeable with the plan.      --------------------------------- IMPRESSION AND DISPOSITION ---------------------------------    IMPRESSION  1. Lower abdominal pain    2. Diarrhea, unspecified type    3.  Nausea        DISPOSITION  Disposition: Discharge to home  Patient condition is stable                Jean Sahu MD  02/03/22 0010       Jaen Sahu MD  02/03/22 [id number]

## 2022-02-05 LAB — URINE CULTURE, ROUTINE: NORMAL
